# Patient Record
Sex: FEMALE | Race: WHITE
[De-identification: names, ages, dates, MRNs, and addresses within clinical notes are randomized per-mention and may not be internally consistent; named-entity substitution may affect disease eponyms.]

---

## 2018-04-26 ENCOUNTER — HOSPITAL ENCOUNTER (EMERGENCY)
Dept: HOSPITAL 56 - MW.ED | Age: 58
Discharge: HOME | End: 2018-04-26
Payer: COMMERCIAL

## 2018-04-26 VITALS — DIASTOLIC BLOOD PRESSURE: 62 MMHG | SYSTOLIC BLOOD PRESSURE: 108 MMHG

## 2018-04-26 DIAGNOSIS — Z23: ICD-10-CM
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
## 2018-04-26 DIAGNOSIS — S91.331A: Primary | ICD-10-CM

## 2018-04-26 DIAGNOSIS — Z91.030: ICD-10-CM

## 2018-04-26 DIAGNOSIS — Z88.8: ICD-10-CM

## 2018-04-26 NOTE — EDM.PDOC
ED HPI GENERAL MEDICAL PROBLEM





- General


Chief Complaint: Lower Extremity Injury/Pain


Stated Complaint: STEPPED ON A JAMIE NAIL RIGHT FOOT


Time Seen by Provider: 04/26/18 20:54


Source of Information: Reports: Patient


History Limitations: Reports: No Limitations





- History of Present Illness


INITIAL COMMENTS - FREE TEXT/NARRATIVE: 





HISTORY AND PHYSICAL:


[]57-year-old female presenting with a right foot injury





History of Present Illness:


[]Patient stepped on a jamie nail while walking in the park today





Review of Systems:


As per history of present illness and below otherwise all 


systems reviewed and negative.  





Past medical history:


As per history of present illness and as reviewed below


otherwise noncontributory.





Surgical history:


As per history of present illness and as reviewed below


otherwise noncontributory.





Social history:


No reported history of drug or alcohol abuse.





Family history:


As per history of present illness and as reviewed below


otherwise noncontributory.





Physical exam:


Alert and oriented female answering questions appropriately has been 10 years 

since she's had a tetanus vaccine she pulled the nail out will need to scrub of 

her foot and give her a tetanus vaccine


HEENT: Atraumatic, normocehpalic, pupils reactive, negative for conjunctival 

pallor or scleral icterus, mucous membranes moist, throat clear, neck supple, 

nontender, trachea midline.  


Lungs: Clear to auscultation, breath sounds equal bilaterally, chest non 

tender.  


Heart: S1S2, regular, negative for clicks, rubs, or JVD.


Abdomen: Soft, nondistended, nontender.  Negative for masses or 

hepatossplenmegaly. Negative for costovertebral tenderness.


Pelvis: Stable nontender.


Genitourinary: Deferred.


Rectal: Deferred


Extremities: Atraumatic, negative for cords or calf pain.  


Neurovascular unremarkable.


Neuro:  Awake, alert, oriented.  Cranial nerves II through XII


unremarkable.  Cerebellum unremarkable.  Motor and sensory unremarkable 

throughout.  Exam nonfocal.  





Diagnostics:


[]





Therapeutics:


[]Hot soapy water scrub to her right heel


adacel





Impression:


[]Puncture wound right heel





Plan:


[]Keep area clean and dry


Heel would be tender for several days


Follow up with your primary care provider should you have any further 

difficulties


Return to the emergency room as discussed and directed





Definitive disposition and diagnosis as appropriate pending


reevaluation and review of above.  





Onset: Today, Sudden


Duration: Hour(s):


Location: Reports: Lower Extremity, Right


Quality: Reports: Ache


Severity: Moderate


Improves with: Reports: None


Worsens with: Reports: None


  ** right foot


Pain Score (Numeric/FACES): 5





- Related Data


 Allergies











Allergy/AdvReac Type Severity Reaction Status Date / Time


 


prochlorperazine Allergy  Swelling Verified 04/26/18 20:33





[From Compazine]     


 


bee stings Allergy  Anaphylactic Uncoded 04/26/18 20:33





   Shock  











Home Meds: 


 Home Meds





Cyanocobalamin (Vitamin B12) [Vitamin B12] 100 mcg PO DAILY 07/24/17 [History]


Fish Oil/Omega-3 Fatty Acids [Fish Oil 1,000 MG] 1,000 mg PO DAILY 07/24/17 [

History]


Multivitamin [Multivitamins] 1 tab PO DAILY 07/24/17 [History]











Past Medical History


HEENT History: Reports: None


Cardiovascular History: Reports: None


Respiratory History: Reports: None


Gastrointestinal History: Reports: Colon Polyp


Genitourinary History: Reports: None


OB/GYN History: Reports: Pregnancy


Musculoskeletal History: Reports: None


Neurological History: Reports: None


Psychiatric History: Reports: Anxiety, Depression


Endocrine/Metabolic History: Reports: None


Hematologic History: Reports: None


Immunologic History: Reports: None


Oncologic (Cancer) History: Reports: Breast


Dermatologic History: Reports: None





- Infectious Disease History


Infectious Disease History: Reports: None





- Past Surgical History


Head Surgeries/Procedures: Reports: None


HEENT Surgical History: Reports: Tonsillectomy


GI Surgical History: Reports: Colonoscopy


Female  Surgical History: Reports: Breast Implant, Breast Reduction, 

Mastectomy, Other (See Below)


Other Female  Surgeries/Procedures: salbador mastectomy with breast reconstruction 

with implants


Musculoskeletal Surgical History: Reports: Other (See Below)


Other Musculoskeletal Surgeries/Procedures:: surgery on rt foot for exc of bone 

spur





Social & Family History





- Family History


Family Medical History: Noncontributory





- Tobacco Use


Smoking Status *Q: Never Smoker





- Caffeine Use


Caffeine Use: Reports: Coffee





- Recreational Drug Use


Recreational Drug Use: No


Drug Use in Last 12 Months: No





Review of Systems





- Review of Systems


Review Of Systems: ROS reveals no pertinent complaints other than HPI.





ED EXAM, GENERAL





- Physical Exam


Exam: See Below (see dictation)





Course





- Vital Signs


Last Recorded V/S: 





 Last Vital Signs











Temp  37.1 C   04/26/18 20:33


 


Pulse  75   04/26/18 20:33


 


Resp  18   04/26/18 20:33


 


BP  113/67   04/26/18 20:33


 


Pulse Ox  98   04/26/18 20:33














Departure





- Departure


Time of Disposition: 20:59


Disposition: Home, Self-Care 01


Condition: Good


Clinical Impression: 


Puncture wound of right heel


Qualifiers:


 Encounter type: initial encounter Qualified Code(s): S91.331A - Puncture wound 

without foreign body, right foot, initial encounter








- Discharge Information


Instructions:  Puncture Wound


Referrals: 


PCP,None [Primary Care Provider] - 


Additional Instructions: 


The following information is given to patients seen in the emergency department 

who are being discharged to home. This information is to outline your options 

for follow-up care. We provide all patients seen in our emergency department 

with a follow-up referral.





The need for follow-up, as well as the timing and circumstances, are variable 

depending upon the specifics of your emergency department visit.





If you don't have a primary care physician on staff, we will provide you with a 

referral. We always advise you to contact your personal physician following an 

emergency department visit to inform them of the circumstance of the visit and 

for follow-up with them and/or the need for any referrals to a consulting 

specialist.





The emergency department will also refer you to a specialist when appropriate. 

This referral assures that you have the opportunity for followup care with a 

specialist. All of these measure are taken in an effort to provide you with 

optimal care, which includes your followup.





Under all circumstances we always encourage you to contact your private 

physician who remains a resource for coordinating  your care. When calling for 

followup care, please make the office aware that this follow-up is from your 

recent emergency room visit. If for any reason you are refused follow-up, 

please contact the [facility] emergency department at [phone] 

and asked to speak to the emergency department charge nurse.





You were given a tetanus vaccine while in the emergency department


Keep Area clean and dry


Follow-up with your primary care provider


Return to the emergency department as discussed and directed

## 2020-01-05 ENCOUNTER — HOSPITAL ENCOUNTER (INPATIENT)
Dept: HOSPITAL 84 - D.ER | Age: 60
LOS: 3 days | Discharge: HOME | DRG: 195 | End: 2020-01-08
Attending: FAMILY MEDICINE | Admitting: FAMILY MEDICINE
Payer: COMMERCIAL

## 2020-01-05 VITALS
BODY MASS INDEX: 23.91 KG/M2 | HEIGHT: 67 IN | WEIGHT: 152.32 LBS | HEIGHT: 67 IN | BODY MASS INDEX: 23.91 KG/M2 | WEIGHT: 152.32 LBS

## 2020-01-05 VITALS — SYSTOLIC BLOOD PRESSURE: 122 MMHG | DIASTOLIC BLOOD PRESSURE: 49 MMHG

## 2020-01-05 DIAGNOSIS — J98.4: ICD-10-CM

## 2020-01-05 DIAGNOSIS — J18.9: Primary | ICD-10-CM

## 2020-01-05 DIAGNOSIS — Z85.3: ICD-10-CM

## 2020-01-05 LAB
ALBUMIN SERPL-MCNC: 3.7 G/DL (ref 3.4–5)
ALP SERPL-CCNC: 92 U/L (ref 46–116)
ALT SERPL-CCNC: 24 U/L (ref 10–68)
ANION GAP SERPL CALC-SCNC: 13.9 MMOL/L (ref 8–16)
APPEARANCE UR: CLEAR
BASOPHILS NFR BLD AUTO: 0.2 % (ref 0–2)
BILIRUB SERPL-MCNC: 0.22 MG/DL (ref 0.2–1.3)
BILIRUB SERPL-MCNC: NEGATIVE MG/DL
BUN SERPL-MCNC: 11 MG/DL (ref 7–18)
CALCIUM SERPL-MCNC: 9.1 MG/DL (ref 8.5–10.1)
CHLORIDE SERPL-SCNC: 99 MMOL/L (ref 98–107)
CO2 SERPL-SCNC: 28.2 MMOL/L (ref 21–32)
COLOR UR: YELLOW
CREAT SERPL-MCNC: 0.8 MG/DL (ref 0.6–1.3)
EOSINOPHIL NFR BLD: 0.3 % (ref 0–7)
ERYTHROCYTE [DISTWIDTH] IN BLOOD BY AUTOMATED COUNT: 12.7 % (ref 11.5–14.5)
GLOBULIN SER-MCNC: 4.1 G/L
GLUCOSE SERPL-MCNC: 107 MG/DL (ref 74–106)
GLUCOSE SERPL-MCNC: NEGATIVE MG/DL
HCT VFR BLD CALC: 37.7 % (ref 36–48)
HGB BLD-MCNC: 12.5 G/DL (ref 12–16)
IMM GRANULOCYTES NFR BLD: 0.2 % (ref 0–5)
KETONES UR STRIP-MCNC: (no result) MG/DL
LYMPHOCYTES NFR BLD AUTO: 19.7 % (ref 15–50)
MCH RBC QN AUTO: 29.8 PG (ref 26–34)
MCHC RBC AUTO-ENTMCNC: 33.2 G/DL (ref 31–37)
MCV RBC: 89.8 FL (ref 80–100)
MONOCYTES NFR BLD: 5.8 % (ref 2–11)
NEUTROPHILS NFR BLD AUTO: 73.8 % (ref 40–80)
NITRITE UR-MCNC: NEGATIVE MG/ML
OSMOLALITY SERPL CALC.SUM OF ELEC: 272 MOSM/KG (ref 275–300)
PH UR STRIP: 5 [PH] (ref 5–6)
PLATELET # BLD: 294 10X3/UL (ref 130–400)
PMV BLD AUTO: 9.9 FL (ref 7.4–10.4)
POTASSIUM SERPL-SCNC: 4.1 MMOL/L (ref 3.5–5.1)
PROT SERPL-MCNC: 7.8 G/DL (ref 6.4–8.2)
PROT UR-MCNC: NEGATIVE MG/DL
RBC # BLD AUTO: 4.2 10X6/UL (ref 4–5.4)
RBC #/AREA URNS HPF: (no result) /HPF (ref 0–5)
SODIUM SERPL-SCNC: 137 MMOL/L (ref 136–145)
SP GR UR STRIP: 1.01 (ref 1–1.02)
SQUAMOUS #/AREA URNS HPF: (no result) /HPF (ref 0–5)
UROBILINOGEN UR-MCNC: NORMAL MG/DL
WBC # BLD AUTO: 12.8 10X3/UL (ref 4.8–10.8)
WBC #/AREA URNS HPF: (no result) /HPF

## 2020-01-06 VITALS — DIASTOLIC BLOOD PRESSURE: 70 MMHG | SYSTOLIC BLOOD PRESSURE: 131 MMHG

## 2020-01-06 VITALS — DIASTOLIC BLOOD PRESSURE: 59 MMHG | SYSTOLIC BLOOD PRESSURE: 106 MMHG

## 2020-01-06 VITALS — DIASTOLIC BLOOD PRESSURE: 68 MMHG | SYSTOLIC BLOOD PRESSURE: 117 MMHG

## 2020-01-06 VITALS — DIASTOLIC BLOOD PRESSURE: 69 MMHG | SYSTOLIC BLOOD PRESSURE: 122 MMHG

## 2020-01-06 VITALS — SYSTOLIC BLOOD PRESSURE: 115 MMHG | DIASTOLIC BLOOD PRESSURE: 66 MMHG

## 2020-01-06 VITALS — DIASTOLIC BLOOD PRESSURE: 63 MMHG | SYSTOLIC BLOOD PRESSURE: 119 MMHG

## 2020-01-06 LAB
ANION GAP SERPL CALC-SCNC: 16.3 MMOL/L (ref 8–16)
BASOPHILS NFR BLD AUTO: 0 % (ref 0–2)
BUN SERPL-MCNC: 11 MG/DL (ref 7–18)
CALCIUM SERPL-MCNC: 9 MG/DL (ref 8.5–10.1)
CHLORIDE SERPL-SCNC: 105 MMOL/L (ref 98–107)
CO2 SERPL-SCNC: 23.8 MMOL/L (ref 21–32)
CREAT SERPL-MCNC: 0.7 MG/DL (ref 0.6–1.3)
EOSINOPHIL NFR BLD: 0 % (ref 0–7)
ERYTHROCYTE [DISTWIDTH] IN BLOOD BY AUTOMATED COUNT: 12.8 % (ref 11.5–14.5)
GLUCOSE SERPL-MCNC: 149 MG/DL (ref 74–106)
HCT VFR BLD CALC: 35 % (ref 36–48)
HGB BLD-MCNC: 11.4 G/DL (ref 12–16)
IMM GRANULOCYTES NFR BLD: 0.1 % (ref 0–5)
LYMPHOCYTES NFR BLD AUTO: 11.9 % (ref 15–50)
MAGNESIUM SERPL-MCNC: 2.3 MG/DL (ref 1.8–2.4)
MCH RBC QN AUTO: 29.1 PG (ref 26–34)
MCHC RBC AUTO-ENTMCNC: 32.6 G/DL (ref 31–37)
MCV RBC: 89.3 FL (ref 80–100)
MONOCYTES NFR BLD: 1 % (ref 2–11)
NEUTROPHILS NFR BLD AUTO: 87 % (ref 40–80)
OSMOLALITY SERPL CALC.SUM OF ELEC: 282 MOSM/KG (ref 275–300)
PHOSPHATE SERPL-MCNC: 3.8 MG/DL (ref 2.5–4.9)
PLATELET # BLD: 275 10X3/UL (ref 130–400)
PMV BLD AUTO: 10.2 FL (ref 7.4–10.4)
POTASSIUM SERPL-SCNC: 4.1 MMOL/L (ref 3.5–5.1)
RBC # BLD AUTO: 3.92 10X6/UL (ref 4–5.4)
SODIUM SERPL-SCNC: 141 MMOL/L (ref 136–145)
WBC # BLD AUTO: 7.1 10X3/UL (ref 4.8–10.8)

## 2020-01-06 NOTE — NUR
PATIENT RESTING IN BED ON PHONE AND DENIES NEEDS AT THIS TIME. BED IN LOWEST
POSITION AND CALL LIGHT WITHIN REACH. ENCOURAGED THE PATIENT TO CALL IF SHE
HAS NEEDS. WILL CONTINUE TO MONITOR.

## 2020-01-06 NOTE — NUR
ADMINISTERED MEDS PER ORDERS. PATIENT REFUSED MIDNIGHT VITALS. PATIENT DENIES
OTHER NEEDS AT THIS TIME. BED IN LOWEST POSITION AND CALL LIGHT WITHIN REACH.
ENCOURAGED THE PATIENT TO CALL IF SHE HAS NEEDS. WILL CONTINUE TO MONITOR.

## 2020-01-06 NOTE — NUR
PATIENT RECIEVED RESTING IN BED WITH EYES OPEN, ALERT AND ORIENTED. ADMITTED
FOR PNEUMONIA, RESPIRTATIONS REGULAR AND NON-LABORED BUT PATINET REPORTS SOME
SHORTNESS OF BREATH WITH EXERTION. PREPORTS INFREQUENT DRY COUGH. CL IN REACH

## 2020-01-07 VITALS — DIASTOLIC BLOOD PRESSURE: 85 MMHG | SYSTOLIC BLOOD PRESSURE: 174 MMHG

## 2020-01-07 VITALS — SYSTOLIC BLOOD PRESSURE: 150 MMHG | DIASTOLIC BLOOD PRESSURE: 82 MMHG

## 2020-01-07 VITALS — SYSTOLIC BLOOD PRESSURE: 145 MMHG | DIASTOLIC BLOOD PRESSURE: 72 MMHG

## 2020-01-07 VITALS — DIASTOLIC BLOOD PRESSURE: 65 MMHG | SYSTOLIC BLOOD PRESSURE: 140 MMHG

## 2020-01-07 VITALS — SYSTOLIC BLOOD PRESSURE: 178 MMHG | DIASTOLIC BLOOD PRESSURE: 87 MMHG

## 2020-01-07 NOTE — NUR
PT SITTING UP IN BED WITHOUT DISTRESS, AOX4. IV RIGHT FA SL, FLUSHES EASILY.
DENIES NEEDS AT THIS TIME. CL IN REACH, WILL CTM

## 2020-01-07 NOTE — NUR
PATIENT RECIEVED RESTING IN BED, ALERT ORIENTED, DENIES PAIN OR SHORTNESS OF
BREATH. NON-PRODUCTIVE COUGH REPORTED. CL IN REACH

## 2020-01-07 NOTE — MORECARE
CASE MANAGEMENT DISCHARGE SUMMARY
 
 
PATIENT: MIGUEL VASQUEZ             UNIT: [id number]
ACCOUNT#: [id number]                       ADM DATE: 20
AGE: 59     : [birth year]  SEX: F            ROOM/BED: D.7752    
AUTHOR: JI PARKS                             PHYSICIAN:                               
 
REFERRING PHYSICIAN: ANTHONY EVANS MD           
DATE OF SERVICE: 20
Discharge Plan
 
 
Patient Name: MIGUEL VASQUEZ
Facility: [facility]:[place]
Encounter #: [id number]
Medical Record #: [id number]
: 1960
Planned Disposition: Home or Self Care
Anticipated Discharge Date: 
 
Discharge Date: 
Expected LOS: 
Initial Reviewer: [id number]
Initial Review Date: 2020
Generated: 20  10:35 am 
Comments
 
DCP- Discharge Planning
 
Updated by [email or web address]: Radha Troncoso on 20   8:32 am CT
Patient Name: MIGUEL VASQUEZ                                     
Admission Status: ER   
Accout number: [id number]                              
Admission Date: 2020   
: 1960                                                        
Admission Diagnosis:   
Attending: ANTHONY EVANS                                                
Current LOS:  2   
  
Anticipated DC Date:    
Planned Disposition: Home or Self Care   
Primary Insurance: [employer] [place]   
  
  
Discharge Planning Comments:   
  
CM met with patient to complete initial dc planning assessment.  CM educated 
patient on the CM role and verbal consent given by patient to complete 
assessment.   Patient lives in [place], [place] where she is independent with her 
 
care.  At discharge patient plans to return home and feels this is a safe 
discharge.  CM discussed availability of home health, rehab services, and 
medical equipment. She stated that her fianc? lives in [place] and will 
be her  home at [place].  Patient denied known discharge needs at this time. 
CM will continue to follow and will assist as needed with dc plans/needs.    
  
  
  
  
: Radha Troncoso
 DCPIA - Discharge Planning Initial Assessment
 
Updated by [email or web address]: Radha Troncoso on 20   9:31 am
*  Is the patient Alert and Oriented?
Yes
*  How many steps to enter\exit or inside your home?
 
*  PCP
english
*  [facility]
*  Preadmission Environment
Home Alone
*  ADLs
Independent
*  Equipment
None
*  List name and contact numbers for known caregivers / representatives who 
currently or will assist patient after discharge:
MEDINA DUVALL [phone]
*  Verbal permission to speak to the caregivers and representatives has been 
obtained from the patient.
N/A
*  Community resources currently utilized
None
*  Additional services required to return to the preadmission environment?
No
*  Can the patient safely return to the preadmission environment?
Yes
*  Has this patient been hospitalized within the prior 30 days at any 
hospital?
No
 
 
 
 
 
 
Patient Name: MIGUEL VASQUEZ
 
Encounter #: [id number]
Page [phone]
 
 
 
 
 
Electronically Signed by JI PARKS on 20 at 0935
 
 
 
 
 
 
**All edits/amendments must be made on the electronic document**
 
DICTATION DATE: 20     : ZACK  20     
RPT#: [id number]                                DC DATE:        
                                               STATUS: ADM IN  
[facility]
[address] [place] [address]
***END OF REPORT***

## 2020-01-07 NOTE — HP
PATIENT: MIGUEL VASQUEZ                         MEDICAL RECORD: [id number]
ACCOUNT: [id number]                                    LOCATION:D.MS [id number]
: 64                                            ADMISSION DATE: 20
                                                         PCP: No PCP                 
 
                             HISTORY AND PHYSICAL EXAMINATION
 
 
REASON FOR ADMISSION:  Cough, congestion, and fevers.
 
HISTORY OF PRESENT ILLNESS:  The patient is a 59-year-old  female who
states that she has had a sore throat and congestion, starting 2019.  She
ultimately went to an outpatient clinic in [place] 2 days prior to admission,
she developed fever of 100.8 and increasing cough, congestion.  They had taken a
chest x-ray that did not give her the results and did not start antibiotics. 
She felt worse through the weekend with increasing cough, productive sputum, and
fever to 101 and came to the ED for that reason.  She is a breast cancer
survivor and was concerned there might be something in her chest x-ray related
to that.  She denies diarrhea, nausea, or vomiting.  She admits to some sharp
pain in her right posterior chest with coughing.
 
PAST MEDICAL HISTORY:  Breast cancer in , status post left lumpectomy and
then bilateral mastectomy and reconstruction.  She deferred tamoxifen at that
time.  No other medical history.
 
PAST SURGICAL HISTORY:  She had T and A, TAA-BSO, left lumpectomy breast with
bilateral mastectomy and reconstruction.
 
ALLERGIES:  None mentioned except for Compazine.
 
SOCIAL HISTORY:  Nonsmoker, nondrinker.  She is , works supervising the
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
 
FAMILY HISTORY:  Father  from lung cancer, had diabetes.  Mother has had
some skin cancers and essential hypertension.  She has living one sister in good
health.
 
HOME MEDICATIONS:  None.
 
REVIEW OF SYSTEMS:
CONSTITUTIONAL:  Low-grade fever as mentioned above and fatigue and poor
appetite.
HEENT:  No recent visual change, sinus congestion, or sore throat.
RESPIRATORY:  Expiratory cough and also sputum production.  No hemoptysis.  She
has been mildly dyspneic on exertion.  Pleuritic type pain, right posterior
chest as well.
GASTROINTESTINAL:  No nausea, vomiting, change in stools or blood per rectum. 
He had colonoscopy recently, which she reports as normal.
MUSCULOSKELETAL:  No arthralgias.
GENITOURINARY:  No dysuria or incontinence.
GYNECOLOGICAL:  No vaginal bleeding.
PSYCHIATRIC:  Denies depressed mood.
NEUROLOGICAL:  No known history stroke, TIA, vascular headaches, or seizures.
 
PHYSICAL EXAMINATION:
GENERAL:  Alert 59-year-old female in no acute distress except for coughing. 
The patient is alert and oriented.
VITAL SIGNS:  Temperature is 100.8 Fahrenheit orally, up to 101, heart rate is
 
 
 
HISTORY AND PHYSICAL                           [id number]    MIGUEL VASQUEZ 
 
 
100, blood pressure of 112/74, respirations 18, and sat 95% on room air.
HEENT:  Eyes are clear.
NECK:  Supple, without bruits or masses.
CHEST:  She has fine crackles in the bases, right greater than left.  Wheezes in
the upper lobes on forced expiration.
BREASTS:  Symmetrical implants.
ABDOMEN:  Soft, nontender.  No organomegaly.
GENITOURINARY:  Deferred.
EXTREMITIES:  No CC&E.
NEUROLOGIC:  Oriented to person, place, and time.  Cranial nerves intact.  Gait
normal.
INTEGUMENT:  No rash.
 
LABORATORY DATA:  Shows a normal BMP and Liver functions are normal.  Lactate is
1.0.  White count 12.8 thousand with left shift.  H and H is 12.5 and 37.7
respectively, and platelet count is 294,000.  Chest x-ray showed granulomatous
changes bilaterally.  She has bilateral apical scarring.  She has faint alveolar
densities in the right middle lobe and bilateral bases suggesting pneumonia.
 
ASSESSMENT:
1.  Community-acquired pneumonia.
2.  Granulomatous lung disease.
3.  History of breast cancer.
 
PLAN:  The patient will be admitted for IV antibiotics.  Cultures are
appropriate.  Pulmonary toilet.  Further workup pending clinical course.
 
TRANSINT:[id number] Voice Confirmation ID: [id number] DOCUMENT ID: [id number]
 
 
                                           
                                           ANTHONY EVANS MD           
 
 
 
Electronically Signed by ANTHONY EVANS on 20 at 0744
 
 
 
 
 
 
 
 
 
 
 
CC:                                                             [id number]
DICTATION DATE: 20     :     20 0828      ADM IN  
                                                                              
[facility]                                          
[address] [place] [address]

## 2020-01-08 VITALS — SYSTOLIC BLOOD PRESSURE: 111 MMHG | DIASTOLIC BLOOD PRESSURE: 56 MMHG

## 2020-01-08 VITALS — DIASTOLIC BLOOD PRESSURE: 43 MMHG | SYSTOLIC BLOOD PRESSURE: 133 MMHG

## 2020-01-08 VITALS — DIASTOLIC BLOOD PRESSURE: 60 MMHG | SYSTOLIC BLOOD PRESSURE: 114 MMHG

## 2020-01-08 NOTE — MORECARE
CASE MANAGEMENT DISCHARGE SUMMARY
 
 
PATIENT: MIGUEL VASQUEZ             UNIT: [id number]
ACCOUNT#: [id number]                       ADM DATE: 20
AGE: 59     : 64  SEX: F            ROOM/BED: D.2206    
AUTHOR: JI PARKS                             PHYSICIAN:                               
 
REFERRING PHYSICIAN: ANTHONY EVANS MD           
DATE OF SERVICE: 20
Discharge Plan
 
 
Patient Name: MIGUEL VASQUEZ
Facility: [facility]:[place]
Encounter #: [id number]
Medical Record #: [id number]
: 1960
Planned Disposition: Home or Self Care
Anticipated Discharge Date: 
 
Discharge Date: 
Expected LOS: 
Initial Reviewer: [id number]
Initial Review Date: 2020
Generated: 20  10:15 am 
Comments
 
DCP- Discharge Planning
 
Updated by [email or web address]: Radha Troncoso on 20   8:09 am CT
Patient Name:  MIGUEL VASQUEZ   
Encounter No:  [id number]   
:  1960   
[place] Insurance[place]  [employer] [facility] [place]  
Anticipated DC Date:    
Planned Disposition:  Home or Self Care  
External Planned Provider: :   
  
  
DCP follow-up note: Patient and family in agreement with discharge plan. No 
changes to plan. Case management will follow and assist as needed.  
Radha Troncoso
DCP- Discharge Planning
 
Updated by [email or web address]: Radha Troncoso on 20   8:32 am CT
Patient Name: MIGUEL VASQUEZ                                     
Admission Status: ER   
Accout number: [id number]                              
Admission Date: 2020   
 
: 1960                                                        
Admission Diagnosis:   
Attending: ANTHONY EVANS                                                
Current LOS:  2   
  
Anticipated DC Date:    
Planned Disposition: Home or Self Care   
Primary Insurance: [employer] [facility] [place]   
  
  
Discharge Planning Comments:   
  
CM met with patient to complete initial dc planning assessment.  CM educated 
patient on the CM role and verbal consent given by patient to complete 
assessment.   Patient lives in [place], [place] where she is independent with her 
care.  At discharge patient plans to return home and feels this is a safe 
discharge.  CM discussed availability of home health, rehab services, and 
medical equipment. She stated that her fianc? lives in [place] and will 
be her  home at DC.  Patient denied known discharge needs at this time. 
CM will continue to follow and will assist as needed with dc plans/needs.    
  
  
  
  
: Radha Troncoso
 DCPIA - Discharge Planning Initial Assessment
 
Updated by [email or web address]: Radha Troncoso on 20   9:31 am
*  Is the patient Alert and Oriented?
Yes
*  How many steps to enter\exit or inside your home?
 
*  PCP
english
*  [facility]
*  Preadmission Environment
Home Alone
*  ADLs
Independent
*  Equipment
None
*  List name and contact numbers for known caregivers / representatives who 
currently or will assist patient after discharge:
MEDINA DUVALL [phone]
*  Verbal permission to speak to the caregivers and representatives has been 
obtained from the patient.
N/A
*  Community resources currently utilized
None
*  Additional services required to return to the preadmission environment?
No
 
*  Can the patient safely return to the preadmission environment?
Yes
*  Has this patient been hospitalized within the prior 30 days at any 
hospital?
No
 
 
 
 
 
 
 
Last DP export: 20   8:35 am
Patient Name: MIGUEL VASQUEZ
Encounter #: [id number]
Page [id number]
 
 
 
 
 
Electronically Signed by JI PARKS on 20 at 0915
 
 
 
 
 
 
**All edits/amendments must be made on the electronic document**
 
DICTATION DATE: 20     : ZACK  20     
RPT#: [id number]                                DC DATE:        
                                               STATUS: ADM IN  
[facility]
[address] [place] [address]
***END OF REPORT***

## 2020-01-10 NOTE — MORECARE
CASE MANAGEMENT DISCHARGE SUMMARY
 
 
PATIENT: MIGUEL VASQUEZ             UNIT: [id number]
ACCOUNT#: [id number]                       ADM DATE: 20
AGE: 59     : 64  SEX: F            ROOM/BED: D.2203    
AUTHOR: JI PARKS                             PHYSICIAN:                               
 
REFERRING PHYSICIAN: ANTHONY EVANS MD           
DATE OF SERVICE: 01/10/20
Discharge Plan
 
 
Patient Name: MIGUEL VASQUEZ
Facility: [facility]:[place]
Encounter #: [id number]
Medical Record #: [id number]
: 1960
Planned Disposition: Home or Self Care
Anticipated Discharge Date: 
 
Discharge Date: 2020
Expected LOS: 0
Initial Reviewer: [email or web address]
Initial Review Date: 2020
Generated: 1/10/20   8:45 am 
Comments
 
DCP- Discharge Planning
 
Updated by [id number]: Radha Troncoso on 20   8:09 am CT
Patient Name:  MIGUEL VASQUEZ   
Encounter No:  [id number]   
:  1960   
Primary Insurance:  [employer] [facility]  
Anticipated DC Date:    
Planned Disposition:  Home or Self Care  
External Planned Provider: :   
  
  
DCP follow-up note: Patient and family in agreement with discharge plan. No 
changes to plan. Case management will follow and assist as needed.  
Radha Troncoso
DCP- Discharge Planning
 
Updated by [id number]: Radha Troncoso on 20   8:32 am CT
Patient Name: MIGUEL VASQUEZ                                     
Admission Status: ER   
Accout number: [id number]                              
Admission Date: 2020   
 
: 1960                                                        
Admission Diagnosis:   
Attending: ANTHONY EVANS                                                
Current LOS:  2   
  
Anticipated DC Date:    
Planned Disposition: Home or Self Care   
Primary Insurance: [employer] [facility]   
  
  
Discharge Planning Comments:   
  
CM met with patient to complete initial dc planning assessment.  CM educated 
patient on the CM role and verbal consent given by patient to complete 
assessment.   Patient lives in [place], [place] where she is independent with her 
care.  At discharge patient plans to return home and feels this is a safe 
discharge.  CM discussed availability of home health, rehab services, and 
medical equipment. She stated that her fianc? lives in [place] and will 
be her  home at [place].  Patient denied known discharge needs at this time. 
CM will continue to follow and will assist as needed with dc plans/needs.    
  
  
  
  
: Radha Troncoso
 DCPIA - Discharge Planning Initial Assessment
 
Updated by [email or web address]: Radha Troncoso on 20   9:31 am
*  Is the patient Alert and Oriented?
Yes
*  How many steps to enter\exit or inside your home?
 
*  PCP
english
*  [facility]
*  Preadmission Environment
Home Alone
*  ADLs
Independent
*  Equipment
None
*  List name and contact numbers for known caregivers / representatives who 
currently or will assist patient after discharge:
MEDINA DUVALL [phone]
*  Verbal permission to speak to the caregivers and representatives has been 
obtained from the patient.
N/A
*  Community resources currently utilized
None
*  Additional services required to return to the preadmission environment?
No
 
*  Can the patient safely return to the preadmission environment?
Yes
*  Has this patient been hospitalized within the prior 30 days at any 
hospital?
No
 
 
 
 
 
 
 
Last DP export: 20   8:15 am
Patient Name: MIGUEL VASQUEZ
Encounter #: [id number]
Page [phone]
 
 
 
 
 
Electronically Signed by JI PARKS on 01/10/20 at 0745
 
 
 
 
 
 
**All edits/amendments must be made on the electronic document**
 
DICTATION DATE: 01/10/20 0745     : ZACK  01/10/20 0745     
RPT#: [id number]                                DC DATE:20
                                               STATUS: DIS IN  
[facility]
[address] [place] [address]
***END OF REPORT***